# Patient Record
Sex: FEMALE | URBAN - METROPOLITAN AREA
[De-identification: names, ages, dates, MRNs, and addresses within clinical notes are randomized per-mention and may not be internally consistent; named-entity substitution may affect disease eponyms.]

---

## 2022-02-21 ENCOUNTER — OFFICE VISIT (OUTPATIENT)
Dept: URBAN - METROPOLITAN AREA CLINIC 6 | Facility: CLINIC | Age: 62
End: 2022-02-21
Payer: MEDICARE

## 2022-02-21 DIAGNOSIS — E11.3293 DIABETES MELLITUS TYPE 2 WITH MILD NON-PROLIFERATIVE RETINOPATHY WITHOUT MACULAR EDEMA, BILATERAL: Primary | ICD-10-CM

## 2022-02-21 DIAGNOSIS — H26.493 OTHER SECONDARY CATARACT, BILATERAL: ICD-10-CM

## 2022-02-21 DIAGNOSIS — H43.813 VITREOUS DEGENERATION, BILATERAL: ICD-10-CM

## 2022-02-21 PROCEDURE — 92014 COMPRE OPH EXAM EST PT 1/>: CPT | Performed by: OPHTHALMOLOGY

## 2022-02-21 PROCEDURE — 92134 CPTRZ OPH DX IMG PST SGM RTA: CPT | Performed by: OPHTHALMOLOGY

## 2022-02-21 ASSESSMENT — INTRAOCULAR PRESSURE
OD: 15
OS: 14

## 2022-02-21 NOTE — IMPRESSION/PLAN
Impression: Diabetes mellitus Type 2 with mild non-proliferative retinopathy without macular edema, bilateral: D97.1442. Plan: Diabetes Mellitus Type II with Regressed proliferative Diabetic Retinopathy with absence of  macular edema on moct- Discussed the pathophysiology of diabetes and its effect on the eye. Stressed the importance of strong glucose control. Advised of importance of scheduled dilated examinations, and to contact us immediately for any problems or concerns.